# Patient Record
Sex: FEMALE | Race: WHITE
[De-identification: names, ages, dates, MRNs, and addresses within clinical notes are randomized per-mention and may not be internally consistent; named-entity substitution may affect disease eponyms.]

---

## 2017-11-15 ENCOUNTER — HOSPITAL ENCOUNTER (EMERGENCY)
Dept: HOSPITAL 10 - FTE | Age: 27
Discharge: HOME | End: 2017-11-15
Payer: COMMERCIAL

## 2017-11-15 VITALS
WEIGHT: 128.09 LBS | BODY MASS INDEX: 25.15 KG/M2 | HEIGHT: 60 IN | WEIGHT: 128.09 LBS | HEIGHT: 60 IN | BODY MASS INDEX: 25.15 KG/M2

## 2017-11-15 VITALS
SYSTOLIC BLOOD PRESSURE: 100 MMHG | TEMPERATURE: 98 F | RESPIRATION RATE: 18 BRPM | HEART RATE: 74 BPM | DIASTOLIC BLOOD PRESSURE: 55 MMHG

## 2017-11-15 DIAGNOSIS — G43.909: Primary | ICD-10-CM

## 2017-11-15 DIAGNOSIS — N39.0: ICD-10-CM

## 2017-11-15 PROCEDURE — 96375 TX/PRO/DX INJ NEW DRUG ADDON: CPT

## 2017-11-15 PROCEDURE — 96374 THER/PROPH/DIAG INJ IV PUSH: CPT

## 2017-11-15 PROCEDURE — 81003 URINALYSIS AUTO W/O SCOPE: CPT

## 2017-11-15 NOTE — ERD
ER Documentation


Chief Complaint


Chief Complaint


HEADACHE, HX OF MIGRAINE, NAUSEA VOMITING





HPI


27-year-old female who presents emergency department for headache that started 

last night.  Stated that more on his left sided facial pain.  Also complains of 

light sensitivity.  Took sumatriptan 200 mg/night without relief.  Stated that 

he has this kind of headache and she believes it is her migraine.  LMP: 10/6/

2017.   A1.





Denies head injury, that this is the worst headache of her life, blurry vision, 

changes in vision, throat pain, difficulty swallowing, neck pain, neck stiffness

, shoulder pain, chest pain, back pain, abdominal pain, constipation, diarrhea, 

pregnancy, possibility of being pregnant, urinary symptoms, recent long travel, 

recent exposure to any illness, recent antibiotic use in the last 3 months, 

fever, chills, numbness or tingling sensation, difficulty walking.





No known drug allergies.  Past medical history of migraines.  No surgical 

history.  Social: Works as a .  Smokes 2-3 cigarettes a week.  

Denies use of alcoholic beverages, use of illegal drugs.





ROS


All systems reviewed and are negative except as per history of present illness.





Medications


Home Meds


Active Scripts


Meclizine Hcl* (Antivert*) 12.5 Mg Tab, 25 MG PO Q8 Y for DIZZINESS, #20 TAB


   Prov:SHAI MARTINEZ         11/15/17


Ibuprofen* (Motrin*) 600 Mg Tab, 600 MG PO Q8, #30 TAB


   Prov:SHAI MARTINEZ         11/15/17


Acetamin/Butalbital/Caffeine* (Fioricet*) [id number] Tab, 2 TAB PO Q6H Y 

for PAIN, #30 TAB


   Prov:SHAI MARTINEZ         11/15/17


Metoclopramide* (Reglan*) 10 Mg Tablet, 10 MG PO Q6 Y for NAUSEA AND/OR VOMITING

, #10 TAB


   Prov:SHAI MARTINEZ         11/15/17


Cephalexin* (Keflex*) 500 Mg Capsule, 500 MG PO QID for 5 Days, CAP


   Prov:SHAI MARTINEZ         11/15/17


Ondansetron Hcl* (Zofran*) 4 Mg Tablet, 4 MG PO Q6H for NAUSEA AND/OR VOMITING, 

#30 TAB


   Prov:LUCIO WALKER         12/3/16


Hydrocodone/Acetaminophen (Norco 5-325 Tablet) 1 Each Tablet, 1 TAB PO Q6H Y 

for PAIN, #7 TAB


   Prov:LUCIO WALKER         12/3/16


Naproxen* (Naprosyn*) 500 Mg Tablet, 500 MG PO BID Y for PAIN AND/OR 

INFLAMMATION, #30 TAB


   Prov:LUCIO WALKER         12/3/16


Meclizine Hcl* (Antivert*) 25 Mg Tablet, 25 MG PO Q6H Y for DIZZINESS, #20 TAB


   Prov:WONG HURLEY PA-C         16





Allergies


Allergies:  


Coded Allergies:  


     No Known Allergy (Unverified , 13)





PMhx/Soc


History of Surgery:  Yes (caesarian section)


Anesthesia Reaction:  No


Hx Neurological Disorder:  No


Hx Respiratory Disorders:  No


Hx Cardiac Disorders:  No


Hx Psychiatric Problems:  No


Hx Miscellaneous Medical Probl:  Yes (vertigo)


Hx Alcohol Use:  Yes (socially)


Hx Substance Use:  No


Hx Tobacco Use:  No


Smoking Status:  Never smoker





Physical Exam


Vitals





Vital Signs








  Date Time  Temp Pulse Resp B/P Pulse Ox O2 Delivery O2 Flow Rate FiO2


 


11/15/17 14:25 98.0 74 18 100/55 99 Room Air  


 


11/15/17 10:42 97.7 62 16 128/65 100   








Physical Exam


Const:  []


Head:   Atraumatic 


Eyes:    Normal Conjunctiva


ENT:    Normal External Ears, Nose and Mouth.


Neck:               Full range of motion..~ No meningismus.


Resp:    Clear to auscultation bilaterally


Cardio:    Regular rate and rhythm, no murmurs


Abd:    Soft, non tender, non distended. Normal bowel sounds


Skin:    No petechiae or rashes


Back:    No midline or flank tenderness


Ext:    No cyanosis, or edema


Neur:    Awake and alert


Psych:    Normal Mood and Affect


Results 24 hrs





 Laboratory Tests








Test


  11/15/17


13:00


 


Bedside Urine pH (LAB) 5.5 


 


Bedside Urine Protein (LAB) Negative 


 


Bedside Urine Glucose (UA) Negative 


 


Bedside Urine Ketones (LAB) 2+ 


 


Bedside Urine Blood Trace-lysed 


 


Bedside Urine Nitrite (LAB) Negative 


 


Bedside Urine Leukocyte


Esterase (L 1+ 


 








 Current Medications








 Medications


  (Trade)  Dose


 Ordered  Sig/Nilay


 Route


 PRN Reason  Start Time


 Stop Time Status Last Admin


Dose Admin


 


 Sodium Chloride


  (NS)  1,000 ml @ 


 1,000 mls/hr  Q1H ONCE


 IV


   11/15/17 13:00


 11/15/17 13:59 DC 11/15/17 13:19


 


 


 Ketorolac


 Tromethamine


  (Toradol)  30 mg  ONCE  STAT


 IV


   11/15/17 12:51


 11/15/17 12:53 DC 11/15/17 13:20


 


 


 Diphenhydramine


 HCl


  (Benadryl)  25 mg  ONCE  ONCE


 IV


   11/15/17 13:00


 11/15/17 13:01 DC 11/15/17 13:20


 


 


 Metoclopramide HCl


  (Reglan)  10 mg  ONCE  ONCE


 IV


   11/15/17 13:00


 11/15/17 13:01 DC 11/15/17 13:20


 











Procedures/MDM


27-year-old female who presents emergency department for headache that started 

last night.  Also complains of light sensitivity.  Stated that more on his left 

sided facial pain.  Took sumatriptan 200 mg/night without relief.  Stated that 

he has this kind of headache and she believes it is her migraine.  LMP: 10/6/

2017.   A1.





Denies head injury, that this is the worst headache of her life, blurry vision, 

changes in vision, throat pain, difficulty swallowing, neck pain, neck stiffness

, shoulder pain, chest pain, back pain, abdominal pain, constipation, diarrhea, 

pregnancy, possibility of being pregnant, urinary symptoms, recent long travel, 

recent exposure to any illness, recent antibiotic use in the last 3 months, 

fever, chills, numbness or tingling sensation, difficulty walking.





No known drug allergies.  Past medical history of migraines.  No surgical 

history.  Social: Works as a .  Smokes 2-3 cigarettes a week.  

Denies use of alcoholic beverages, use of illegal drugs.





Physical exam: Left sided frontal and maxillary sinus tenderness.  Negative on 

Brudzinski sign.  Negative on Kernig sign.  No signs of meningeal irritation.  

No neurological deficits.  Cranial nerves II through XII are intact.  Romberg 

test negative.





Patient agreed with the treatment, plan of care, follow-up care.





POC urine pregnancy: Negative.





POC urine dip: Mild UTI.





Treatment: IV insertion.  Normal saline IV.  Toradol IV.  Benadryl IV.  Reglan 

IV.





Reevaluation: Denies headache, dizziness, blurred vision, neck pain, throat pain

, difficulty swallowing, shoulder pain, chest pain, back pain, abdominal pain.  

No abdominal tenderness. Negative on Brudzinski sign.  Negative on Kernig sign.

  No signs of meningeal irritation.  Able to jump 10 times without developing 

abdominal pain.  Cranial nerves II through XII are intact.  No neurological 

deficits.  No neurovascular deficit.  Ambulates with steady gait.  Stated that 

she feels much better this time and is ready to go home.





Differential diagnosis: Subarachnoid hemorrhage versus stroke versus migraine 

versus temporal arteritis versus cluster headache versus tension headache 

versus sinus headache versus sinusitis versus urinary tract infection





Final diagnosis: Migraine mild UTI, 





Prescription: Fioricet, Keflex. Motrin. Reglan.





Follow-up with PCP in the next 24-48 hours. PCP to refer patient to neurologist 

in the next 24-48 hours.  Come back here in the emergency department for any 

new symptoms or any worsening of symptoms.  All questions and concerns were 

answered.  Patient verbalized understanding and agreed with the plan of care.





Hemodynamically stable on discharge.





Departure


Diagnosis:  


 Primary Impression:  


 Migraine


 Additional Impression:  


 UTI (urinary tract infection)


Condition:  Stable





Additional Instructions:  


Follow-up with PCP in the next 24-48 hours. PCP to refer patient to neurologist 

in the next 24-48 hours.  Come back here in the emergency department for any 

new symptoms or any worsening of symptoms.  All questions and concerns were 

answered.  Patient verbalized understanding and agreed with the plan of care.











SHAI MARTINEZ Nov 15, 2017 13:25

## 2018-09-03 ENCOUNTER — HOSPITAL ENCOUNTER (EMERGENCY)
Age: 28
Discharge: HOME | End: 2018-09-03

## 2018-09-03 ENCOUNTER — HOSPITAL ENCOUNTER (EMERGENCY)
Dept: HOSPITAL 91 - FTE | Age: 28
Discharge: HOME | End: 2018-09-03
Payer: COMMERCIAL

## 2018-09-03 DIAGNOSIS — G43.109: Primary | ICD-10-CM

## 2018-09-03 PROCEDURE — 99284 EMERGENCY DEPT VISIT MOD MDM: CPT

## 2018-09-03 PROCEDURE — 81025 URINE PREGNANCY TEST: CPT

## 2018-09-03 PROCEDURE — 70450 CT HEAD/BRAIN W/O DYE: CPT

## 2018-09-03 RX ADMIN — METOCLOPRAMIDE HYDROCHLORIDE 1 MG: 10 TABLET ORAL at 16:33

## 2018-09-03 RX ADMIN — HYDROCODONE BITARTRATE AND ACETAMINOPHEN 1 TAB: 7.5; 325 TABLET ORAL at 16:47

## 2018-09-03 RX ADMIN — LORAZEPAM 1 MG: 0.5 TABLET ORAL at 16:33
